# Patient Record
Sex: FEMALE
[De-identification: names, ages, dates, MRNs, and addresses within clinical notes are randomized per-mention and may not be internally consistent; named-entity substitution may affect disease eponyms.]

---

## 2022-05-02 PROBLEM — Z00.00 ENCOUNTER FOR PREVENTIVE HEALTH EXAMINATION: Status: ACTIVE | Noted: 2022-05-02

## 2022-05-26 ENCOUNTER — APPOINTMENT (OUTPATIENT)
Dept: RHEUMATOLOGY | Facility: CLINIC | Age: 52
End: 2022-05-26

## 2022-10-27 ENCOUNTER — APPOINTMENT (OUTPATIENT)
Dept: RHEUMATOLOGY | Facility: CLINIC | Age: 52
End: 2022-10-27

## 2022-10-27 VITALS
RESPIRATION RATE: 16 BRPM | HEIGHT: 62 IN | TEMPERATURE: 97.8 F | OXYGEN SATURATION: 97 % | HEART RATE: 77 BPM | WEIGHT: 145 LBS | DIASTOLIC BLOOD PRESSURE: 91 MMHG | BODY MASS INDEX: 26.68 KG/M2 | SYSTOLIC BLOOD PRESSURE: 143 MMHG

## 2022-10-27 DIAGNOSIS — M25.50 PAIN IN UNSPECIFIED JOINT: ICD-10-CM

## 2022-10-27 DIAGNOSIS — M50.90 CERVICAL DISC DISORDER, UNSPECIFIED, UNSPECIFIED CERVICAL REGION: ICD-10-CM

## 2022-10-27 DIAGNOSIS — I10 ESSENTIAL (PRIMARY) HYPERTENSION: ICD-10-CM

## 2022-10-27 DIAGNOSIS — K21.9 GASTRO-ESOPHAGEAL REFLUX DISEASE W/OUT ESOPHAGITIS: ICD-10-CM

## 2022-10-27 DIAGNOSIS — M54.9 DORSALGIA, UNSPECIFIED: ICD-10-CM

## 2022-10-27 DIAGNOSIS — Z82.49 FAMILY HISTORY OF ISCHEMIC HEART DISEASE AND OTHER DISEASES OF THE CIRCULATORY SYSTEM: ICD-10-CM

## 2022-10-27 DIAGNOSIS — E78.00 PURE HYPERCHOLESTEROLEMIA, UNSPECIFIED: ICD-10-CM

## 2022-10-27 DIAGNOSIS — Z78.9 OTHER SPECIFIED HEALTH STATUS: ICD-10-CM

## 2022-10-27 DIAGNOSIS — M19.049 PRIMARY OSTEOARTHRITIS, UNSPECIFIED HAND: ICD-10-CM

## 2022-10-27 DIAGNOSIS — J45.909 UNSPECIFIED ASTHMA, UNCOMPLICATED: ICD-10-CM

## 2022-10-27 DIAGNOSIS — R53.83 OTHER FATIGUE: ICD-10-CM

## 2022-10-27 DIAGNOSIS — Z86.39 PERSONAL HISTORY OF OTHER ENDOCRINE, NUTRITIONAL AND METABOLIC DISEASE: ICD-10-CM

## 2022-10-27 LAB
25(OH)D3 SERPL-MCNC: 35.6 NG/ML
ALBUMIN SERPL ELPH-MCNC: 4.6 G/DL
ALP BLD-CCNC: 62 U/L
ALT SERPL-CCNC: 22 U/L
ANION GAP SERPL CALC-SCNC: 11 MMOL/L
AST SERPL-CCNC: 19 U/L
BASOPHILS # BLD AUTO: 0.02 K/UL
BASOPHILS NFR BLD AUTO: 0.5 %
BILIRUB SERPL-MCNC: 0.9 MG/DL
BUN SERPL-MCNC: 14 MG/DL
CALCIUM SERPL-MCNC: 9.7 MG/DL
CHLORIDE SERPL-SCNC: 104 MMOL/L
CO2 SERPL-SCNC: 25 MMOL/L
CREAT SERPL-MCNC: 0.65 MG/DL
CRP SERPL-MCNC: 4 MG/L
EGFR: 105 ML/MIN/1.73M2
EOSINOPHIL # BLD AUTO: 0.07 K/UL
EOSINOPHIL NFR BLD AUTO: 1.9 %
GLUCOSE SERPL-MCNC: 107 MG/DL
HCT VFR BLD CALC: 37.6 %
HGB BLD-MCNC: 12 G/DL
IMM GRANULOCYTES NFR BLD AUTO: 0.3 %
LYMPHOCYTES # BLD AUTO: 1.5 K/UL
LYMPHOCYTES NFR BLD AUTO: 40.9 %
MAN DIFF?: NORMAL
MCHC RBC-ENTMCNC: 24.6 PG
MCHC RBC-ENTMCNC: 31.9 GM/DL
MCV RBC AUTO: 77.2 FL
MONOCYTES # BLD AUTO: 0.4 K/UL
MONOCYTES NFR BLD AUTO: 10.9 %
NEUTROPHILS # BLD AUTO: 1.67 K/UL
NEUTROPHILS NFR BLD AUTO: 45.5 %
PLATELET # BLD AUTO: 269 K/UL
POTASSIUM SERPL-SCNC: 4.5 MMOL/L
PROT SERPL-MCNC: 7.9 G/DL
RBC # BLD: 4.87 M/UL
RBC # FLD: 13 %
SODIUM SERPL-SCNC: 141 MMOL/L
TSH SERPL-ACNC: 2.98 UIU/ML
URATE SERPL-MCNC: 5.6 MG/DL
WBC # FLD AUTO: 3.67 K/UL

## 2022-10-27 PROCEDURE — 99205 OFFICE O/P NEW HI 60 MIN: CPT

## 2022-10-27 RX ORDER — GUAIFENESIN 600 MG/1
600 TABLET, EXTENDED RELEASE ORAL
Qty: 14 | Refills: 0 | Status: ACTIVE | COMMUNITY
Start: 2022-05-29

## 2022-10-27 RX ORDER — ROSUVASTATIN CALCIUM 5 MG/1
5 TABLET, FILM COATED ORAL
Qty: 90 | Refills: 0 | Status: ACTIVE | COMMUNITY
Start: 2022-10-01

## 2022-10-27 RX ORDER — POLYETHYLENE GLYCOL, PROPYLENE GLYCOL .4; .3 G/100ML; G/100ML
0.4-0.3 LIQUID OPHTHALMIC
Qty: 15 | Refills: 0 | Status: ACTIVE | COMMUNITY
Start: 2022-10-01

## 2022-10-27 RX ORDER — NEOMYCIN AND POLYMYXIN B SULFATES AND HYDROCORTISONE OTIC 10; 3.5; 1 MG/ML; MG/ML; [USP'U]/ML
3.5-10000-1 SUSPENSION AURICULAR (OTIC)
Qty: 10 | Refills: 0 | Status: ACTIVE | COMMUNITY
Start: 2022-07-21

## 2022-10-27 RX ORDER — LEVOCETIRIZINE DIHYDROCHLORIDE 5 MG/1
5 TABLET ORAL
Qty: 30 | Refills: 0 | Status: ACTIVE | COMMUNITY
Start: 2022-07-06

## 2022-10-27 RX ORDER — BUDESONIDE AND FORMOTEROL FUMARATE DIHYDRATE 80; 4.5 UG/1; UG/1
80-4.5 AEROSOL RESPIRATORY (INHALATION)
Qty: 31 | Refills: 0 | Status: ACTIVE | COMMUNITY
Start: 2022-07-06

## 2022-10-27 RX ORDER — FLUTICASONE PROPIONATE 50 UG/1
50 SPRAY, METERED NASAL
Qty: 48 | Refills: 0 | Status: ACTIVE | COMMUNITY
Start: 2022-07-06

## 2022-10-27 RX ORDER — METOPROLOL SUCCINATE 25 MG/1
25 TABLET, EXTENDED RELEASE ORAL
Qty: 90 | Refills: 0 | Status: ACTIVE | COMMUNITY
Start: 2022-04-14

## 2022-10-27 RX ORDER — NEOMYCIN AND POLYMYXIN B SULFATES AND DEXAMETHASONE 3.5; 10000; 1 MG/G; [IU]/G; MG/G
3.5-10000-0.1 OINTMENT OPHTHALMIC
Qty: 4 | Refills: 0 | Status: ACTIVE | COMMUNITY
Start: 2022-10-11

## 2022-10-27 RX ORDER — DEXAMETHASONE SODIUM PHOSPHATE 1 MG/ML
0.1 SOLUTION/ DROPS OPHTHALMIC
Qty: 15 | Refills: 0 | Status: ACTIVE | COMMUNITY
Start: 2022-08-30

## 2022-10-27 RX ORDER — CLINDAMYCIN HYDROCHLORIDE 300 MG/1
300 CAPSULE ORAL
Qty: 30 | Refills: 0 | Status: ACTIVE | COMMUNITY
Start: 2022-10-11

## 2022-10-27 RX ORDER — OLOPATADINE HYDROCHLORIDE 2 MG/ML
0.2 SOLUTION OPHTHALMIC
Qty: 2 | Refills: 0 | Status: ACTIVE | COMMUNITY
Start: 2022-10-01

## 2022-10-27 RX ORDER — CHLORHEXIDINE GLUCONATE 4 %
1000 LIQUID (ML) TOPICAL
Qty: 90 | Refills: 0 | Status: ACTIVE | COMMUNITY
Start: 2022-09-24

## 2022-10-27 RX ORDER — LORATADINE 10 MG/1
10 TABLET ORAL
Qty: 20 | Refills: 0 | Status: ACTIVE | COMMUNITY
Start: 2022-05-29

## 2022-10-27 RX ORDER — PANTOPRAZOLE 40 MG/1
40 TABLET, DELAYED RELEASE ORAL
Qty: 90 | Refills: 0 | Status: ACTIVE | COMMUNITY
Start: 2022-10-01

## 2022-10-27 NOTE — HISTORY OF PRESENT ILLNESS
[Arthralgias] : arthralgias [Decreased ROM] : decreased range of motion [Morning Stiffness] : morning stiffness [Dry Eyes] : dry eyes [FreeTextEntry1] : Patient presents and explains progressive pain in the hands, neck and lower back over the last few years.  She reports stiffness experience in the morning that lasts for a few minutes before she can get on with her day.  She performs her ADLs however she notices discomfort in the back when standing for prolonged periods or climbing stairs.  She notes stiffness around the neck which lends difficulty when raising hand overhead or reaching for the back.  She feels that her hand  is maintained however is becoming increasingly difficult to make a fist.  She otherwise denies accompanied swelling though notes intermittent paresthesias in the hands.  She further denies rash, Raynaud's or photosensitivity.  She further denies systemic symptoms. [Weight Loss] : no weight loss [Malaise] : no malaise [Chills] : no chills [Depression] : no depression [Skin Lesions] : no lesions [Skin Nodules] : no skin nodules [Oral Ulcers] : no oral ulcers [Cough] : no cough [Shortness of Breath] : no shortness of breath [Chest Pain] : no chest pain [Joint Swelling] : no joint swelling [Dyspnea] : no dyspnea [Myalgias] : no myalgias [Muscle Weakness] : no muscle weakness [Eye Pain] : no eye pain

## 2022-10-27 NOTE — ASSESSMENT
[FreeTextEntry1] : Patient with diffuse arthralgias:\par Patient to have inflammatory markers and serologies drawn to assess for an underlying inflammatory process lending to presentation in the setting of fatigue and joint pain.  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  L-spine films requested.  Cervical strengthening exercises demonstrated; low-dose NSAID therapy given in the interim.\par Whole food plant-based diet discussed including emphasis on dietary and exercise modification. \par She is in agreement with the above plan and will return in one months' time.\par \par \par

## 2022-10-27 NOTE — PHYSICAL EXAM
[General Appearance - Alert] : alert [General Appearance - In No Acute Distress] : in no acute distress [Sclera] : the sclera and conjunctiva were normal [Edema] : there was no peripheral edema [No Spinal Tenderness] : no spinal tenderness [Motor Exam] : the motor exam was normal [Oriented To Time, Place, And Person] : oriented to person, place, and time [Impaired Insight] : insight and judgment were intact [FreeTextEntry1] : No active hand synovitis with scattered bony hypertrophy over the DIPs and PIPs; appropriate shoulder abduction with tenderness appreciated over the right subacromial bursa with restriction of lateral neck flexion and rotation bilaterally; knees with minimal tenderness; tibiotalar motions intact. [] : no rash [Skin Lesions] : no skin lesions

## 2022-10-27 NOTE — REVIEW OF SYSTEMS
[Joint Stiffness] : joint stiffness [Arthralgias] : arthralgias [Fever] : no fever [Chills] : no chills [Eye Pain] : no eye pain [Sore Throat] : no sore throat [Hoarseness] : no hoarseness [Lower Ext Edema] : no lower extremity edema [Cough] : no cough [SOB on Exertion] : no shortness of breath during exertion [Depression] : no depression [Anxiety] : no anxiety [Easy Bleeding] : no tendency for easy bleeding [Easy Bruising] : no tendency for easy bruising

## 2022-10-28 LAB
APPEARANCE: CLEAR
BILIRUBIN URINE: NEGATIVE
BLOOD URINE: NEGATIVE
COLOR: YELLOW
ERYTHROCYTE [SEDIMENTATION RATE] IN BLOOD BY WESTERGREN METHOD: 37 MM/HR
GLUCOSE QUALITATIVE U: NEGATIVE
KETONES URINE: NEGATIVE
LEUKOCYTE ESTERASE URINE: NEGATIVE
NITRITE URINE: NEGATIVE
PH URINE: 6.5
PROTEIN URINE: NORMAL
SPECIFIC GRAVITY URINE: 1.03
UROBILINOGEN URINE: NORMAL

## 2022-11-02 LAB — ANACR T: NEGATIVE

## 2022-11-07 LAB — 14-3-3 ETA AG SER IA-MCNC: <0.2 NG/ML

## 2023-02-16 ENCOUNTER — APPOINTMENT (OUTPATIENT)
Dept: RHEUMATOLOGY | Facility: CLINIC | Age: 53
End: 2023-02-16

## 2023-03-23 ENCOUNTER — NON-APPOINTMENT (OUTPATIENT)
Age: 53
End: 2023-03-23